# Patient Record
Sex: FEMALE | Race: WHITE
[De-identification: names, ages, dates, MRNs, and addresses within clinical notes are randomized per-mention and may not be internally consistent; named-entity substitution may affect disease eponyms.]

---
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
## 2021-09-20 ENCOUNTER — HOSPITAL ENCOUNTER (EMERGENCY)
Dept: HOSPITAL 76 - ED | Age: 21
Discharge: HOME | End: 2021-09-20
Payer: SELF-PAY

## 2021-09-20 ENCOUNTER — HOSPITAL ENCOUNTER (OUTPATIENT)
Dept: HOSPITAL 76 - EMS | Age: 21
Discharge: ANOTHER HOSPITAL | End: 2021-09-20
Payer: SELF-PAY

## 2021-09-20 VITALS — SYSTOLIC BLOOD PRESSURE: 107 MMHG | DIASTOLIC BLOOD PRESSURE: 44 MMHG

## 2021-09-20 DIAGNOSIS — S89.92XA: Primary | ICD-10-CM
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
## 2021-09-20 DIAGNOSIS — S83.92XA: Primary | ICD-10-CM
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
## 2021-09-20 PROCEDURE — 99283 EMERGENCY DEPT VISIT LOW MDM: CPT

## 2021-09-20 PROCEDURE — 99282 EMERGENCY DEPT VISIT SF MDM: CPT
